# Patient Record
Sex: FEMALE | Race: WHITE | URBAN - METROPOLITAN AREA
[De-identification: names, ages, dates, MRNs, and addresses within clinical notes are randomized per-mention and may not be internally consistent; named-entity substitution may affect disease eponyms.]

---

## 2019-02-08 ENCOUNTER — APPOINTMENT (RX ONLY)
Dept: URBAN - METROPOLITAN AREA CLINIC 11 | Facility: CLINIC | Age: 34
Setting detail: DERMATOLOGY
End: 2019-02-08

## 2019-02-08 DIAGNOSIS — Z41.1 ENCOUNTER FOR COSMETIC SURGERY: ICD-10-CM

## 2019-02-08 PROCEDURE — 99003: CPT

## 2019-02-08 PROCEDURE — ? CONSULTATION - COSMETIC CAPSULAR CONTRACTURE

## 2019-02-08 PROCEDURE — ? CONSULTATION - BREAST (COSMETIC)

## 2019-02-08 ASSESSMENT — BAKER GRADE, RIGHT BREAST
IN YOUR EXPERIENCE, AMONG ALL PATIENTS YOU HAVE SEEN WITH THIS CONDITION, HOW SEVERE IS THE RIGHT BREAST CONTRACTURE?: NORMALLY SOFT AND NATURAL IN SIZE AND SHAPE

## 2019-02-08 ASSESSMENT — LOCATION SIMPLE DESCRIPTION DERM
LOCATION SIMPLE: LEFT BREAST
LOCATION SIMPLE: RIGHT BREAST

## 2019-02-08 ASSESSMENT — LOCATION DETAILED DESCRIPTION DERM
LOCATION DETAILED: RIGHT MEDIAL BREAST 5-6:00 REGION
LOCATION DETAILED: LEFT MEDIAL BREAST 7-8:00 REGION
LOCATION DETAILED: RIGHT PERIAREOLAR BREAST 5-6:00 REGION
LOCATION DETAILED: LEFT MEDIAL BREAST 8-9:00 REGION

## 2019-02-08 ASSESSMENT — LOCATION ZONE DERM: LOCATION ZONE: TRUNK

## 2019-02-08 ASSESSMENT — BAKER GRADE, LEFT BREAST
IN YOUR EXPERIENCE, AMONG ALL PATIENTS YOU HAVE SEEN WITH THIS CONDITION, HOW SEVERE IS THE LEFT BREAST CONTRACTURE?: NORMALLY SOFT AND NATURAL IN SIZE AND SHAPE

## 2019-02-08 NOTE — HPI: BREAST (AESTHETIC DEFORMITY, BREAST)
Which Breast(S) Are You Concerned About?: bilateral breasts
How Severe Are Your Concerns?: mild
Is This A New Presentation, Or A Follow-Up?: Breast (Aesthetic Breast Deformity)

## 2019-02-08 NOTE — PROCEDURE: CONSULTATION - BREAST (COSMETIC)
Saline Implant Size(S) - Right: 360
Send Procedure Quote As Charge: No
Saline Implant Size(S) - Left: 360
Detail Level: Simple